# Patient Record
Sex: MALE | URBAN - METROPOLITAN AREA
[De-identification: names, ages, dates, MRNs, and addresses within clinical notes are randomized per-mention and may not be internally consistent; named-entity substitution may affect disease eponyms.]

---

## 2017-03-06 ENCOUNTER — TELEPHONE (OUTPATIENT)
Dept: OTHER | Facility: OUTPATIENT CENTER | Age: 62
End: 2017-03-06

## 2017-11-14 ENCOUNTER — TEAM CONFERENCE (OUTPATIENT)
Dept: OTHER | Facility: OUTPATIENT CENTER | Age: 62
End: 2017-11-14

## 2017-11-14 NOTE — TELEPHONE ENCOUNTER
Case Summary Record    Name:  Raghu Torres; Tia  Diagnosis:  302.85 Gender Dysphoria   296.32 Major Depression, moderate  300.0 - Anxiety NOS     Rule Out - 302.3 Transvestic Disorder (DSM V)  Date of Summary:  11/14/2017   Date of Initial [facility] Appointment:  1/29/2015  Date of Last [facility] Appointment:  12/12/2016    Evaluation of Client Status:    Total number of Individual, conjoint, group sessions patient attended:  7 -10    Goals at intake were:   Prepare for ongoing therapy    At the time of discharge, the client reported the following progress:  Increased insight  Changes in behavior  Greater emotional health    Therapist Assessment:  Client discontinued therapy prematurely due to [place] of [place]. Client noted plans of seeking therapeutic care once better acquainted with her new location.     Prognosis:    Modest    Referred to:  n/a     Electronically Signed by:  Anjali Lopez

## 2020-03-10 ENCOUNTER — HEALTH MAINTENANCE LETTER (OUTPATIENT)
Age: 65
End: 2020-03-10

## 2020-12-20 ENCOUNTER — HEALTH MAINTENANCE LETTER (OUTPATIENT)
Age: 65
End: 2020-12-20

## 2021-04-24 ENCOUNTER — HEALTH MAINTENANCE LETTER (OUTPATIENT)
Age: 66
End: 2021-04-24

## 2021-10-03 ENCOUNTER — HEALTH MAINTENANCE LETTER (OUTPATIENT)
Age: 66
End: 2021-10-03

## 2021-11-28 ENCOUNTER — HEALTH MAINTENANCE LETTER (OUTPATIENT)
Age: 66
End: 2021-11-28

## 2022-09-11 ENCOUNTER — HEALTH MAINTENANCE LETTER (OUTPATIENT)
Age: 67
End: 2022-09-11

## 2023-01-22 ENCOUNTER — HEALTH MAINTENANCE LETTER (OUTPATIENT)
Age: 68
End: 2023-01-22

## 2024-02-18 ENCOUNTER — HEALTH MAINTENANCE LETTER (OUTPATIENT)
Age: 69
End: 2024-02-18